# Patient Record
Sex: MALE | Race: WHITE | NOT HISPANIC OR LATINO | Employment: FULL TIME | URBAN - METROPOLITAN AREA
[De-identification: names, ages, dates, MRNs, and addresses within clinical notes are randomized per-mention and may not be internally consistent; named-entity substitution may affect disease eponyms.]

---

## 2020-05-26 ENCOUNTER — HOSPITAL ENCOUNTER (OUTPATIENT)
Dept: OTHER | Facility: HOSPITAL | Age: 20
Discharge: HOME | End: 2020-05-26
Attending: NURSE PRACTITIONER

## 2021-04-11 ENCOUNTER — HOSPITAL ENCOUNTER (EMERGENCY)
Dept: HOSPITAL 49 - FER | Age: 21
LOS: 1 days | Discharge: PSYCHIATRIC OR SUBSTANCE USE FACILITY | End: 2021-04-12
Payer: COMMERCIAL
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
## 2021-04-11 DIAGNOSIS — S16.1XXA: Primary | ICD-10-CM

## 2021-04-11 DIAGNOSIS — Z20.822: ICD-10-CM

## 2021-04-11 DIAGNOSIS — J45.909: ICD-10-CM

## 2021-04-11 DIAGNOSIS — Z88.5: ICD-10-CM

## 2021-04-11 DIAGNOSIS — S00.93XA: ICD-10-CM

## 2021-04-11 LAB
BASOPHIL: 0.6 % (ref 0–2)
BUN SERPL-MCNC: 17 MG/DL (ref 7–18)
BUN/CREAT RATIO (CALC): 16.8 RATIO
CHLORIDE: 102 MMOL/L (ref 98–107)
CO2 (BICARBONATE): 30 MMOL/L (ref 21–32)
CREATININE: 1.01 MG/DL (ref 0.67–1.17)
EOSINOPHIL: 1.3 % (ref 0–5)
GLUCOSE SERPL-MCNC: 103 MG/DL (ref 74–106)
HCT: 48.7 % (ref 42–52)
HGB BLD-MCNC: 16.7 G/DL (ref 13.2–18)
LYMPHOCYTE: 24 % (ref 15–48)
MCH RBC QN AUTO: 30.7 PG (ref 25–31)
MCHC RBC AUTO-ENTMCNC: 34.3 G/DL (ref 32–36)
MCV: 89.5 FL (ref 78–100)
MONOCYTE: 8.3 % (ref 0–12)
MPV: 9 FL (ref 6–9.5)
NEUTROPHIL: 64.6 % (ref 41–80)
NRBC: 0
PLT: 287 K/UL (ref 150–400)
POTASSIUM: 4.1 MMOL/L (ref 3.5–5.1)
RBC MORPHOLOGY: NORMAL
RBC: 5.44 M/UL (ref 4.7–6)
RDW: 13.2 % (ref 11.5–14)
WBC: 11.2 K/UL (ref 4–10.5)

## 2021-04-11 PROCEDURE — U0002 COVID-19 LAB TEST NON-CDC: HCPCS

## 2021-04-12 LAB
AMPHETAMINES: NEGATIVE
BARBITURATES: NEGATIVE
CORONAVIRUS 2019 SARS-COV-2: NEGATIVE
ECSTASY (MDMA): NEGATIVE
INFLUENZA A NAA: NEGATIVE
MARIJUANA (THC): NEGATIVE
METHADONE: NEGATIVE
OPIATES: POSITIVE
OXYCODONE: NEGATIVE

## 2022-05-04 ENCOUNTER — HOSPITAL ENCOUNTER (EMERGENCY)
Dept: HOSPITAL 49 - FER | Age: 22
Discharge: LEFT BEFORE DISCHARGE | End: 2022-05-04
Payer: COMMERCIAL

## 2022-05-04 DIAGNOSIS — R10.13: Primary | ICD-10-CM

## 2022-05-04 DIAGNOSIS — Z53.29: ICD-10-CM

## 2022-06-22 ENCOUNTER — OFFICE VISIT (OUTPATIENT)
Dept: FAMILY MEDICINE CLINIC | Age: 22
End: 2022-06-22

## 2022-06-22 VITALS
WEIGHT: 174.6 LBS | HEART RATE: 89 BPM | HEIGHT: 66 IN | SYSTOLIC BLOOD PRESSURE: 122 MMHG | BODY MASS INDEX: 28.06 KG/M2 | TEMPERATURE: 98.1 F | DIASTOLIC BLOOD PRESSURE: 74 MMHG

## 2022-06-22 DIAGNOSIS — F41.1 GENERALIZED ANXIETY DISORDER: Primary | ICD-10-CM

## 2022-06-22 DIAGNOSIS — Z20.2 EXPOSURE TO SEXUALLY TRANSMITTED DISEASE (STD): ICD-10-CM

## 2022-06-22 DIAGNOSIS — K21.9 GASTROESOPHAGEAL REFLUX DISEASE, UNSPECIFIED WHETHER ESOPHAGITIS PRESENT: ICD-10-CM

## 2022-06-22 PROCEDURE — 99204 OFFICE O/P NEW MOD 45 MIN: CPT | Performed by: NURSE PRACTITIONER

## 2022-06-22 RX ORDER — VENLAFAXINE HYDROCHLORIDE 37.5 MG/1
37.5 CAPSULE, EXTENDED RELEASE ORAL DAILY
Qty: 30 CAPSULE | Refills: 1 | Status: SHIPPED | OUTPATIENT
Start: 2022-06-22 | End: 2022-09-08

## 2022-06-22 RX ORDER — OMEPRAZOLE 20 MG/1
20 CAPSULE, DELAYED RELEASE ORAL DAILY
Qty: 30 CAPSULE | Refills: 1 | Status: SHIPPED | OUTPATIENT
Start: 2022-06-22 | End: 2022-09-08

## 2022-06-22 NOTE — PROGRESS NOTES
Assessment and Plan    Diagnoses and all orders for this visit:    1. Generalized anxiety disorder (Primary)  Comments:  We will try to start on Effexor.  I instructed on importance of compliance with medication instructions.  Follow-up in 6 weeks  Orders:  -     venlafaxine XR (Effexor XR) 37.5 MG 24 hr capsule; Take 1 capsule by mouth Daily.  Dispense: 30 capsule; Refill: 1    2. Exposure to sexually transmitted disease (STD)  -     Hepatitis C RNA, quantitative, PCR (graph); Future  -     HIV-1/O/2 ANTIGEN/ANTIBODY, 4TH GENERATION; Future  -     HSV 1 and 2-Specific Ab, IgG; Future  -     RPR; Future  -     Chlamydia trachomatis, Neisseria gonorrhoeae, PCR - Urine, Urine, Clean Catch; Future    3. Gastroesophageal reflux disease, unspecified whether esophagitis present  Comments:  I will send in a referral for omeprazole however I did discuss that should this persist we may need to send him to gastroenterology for evaluation as to cause.  Orders:  -     omeprazole (priLOSEC) 20 MG capsule; Take 1 capsule by mouth Daily.  Dispense: 30 capsule; Refill: 1        Follow Up   Return in about 6 weeks (around 8/3/2022).    Chief Complaint  Malcolm Weeks presents to [facility] for Establish Care (Patient is here to establish care. Patient states that he has not had a PCP since he was a kid. ), Anxiety (PHQ-9 AND MARCELA 7 completed ), Vomiting (Patient states that this morning he was throwing up stomach acid. States that he has trouble with acid reflux and thinks he may need medication. ), and Exposure to STD (Patient wants to be tested for Herpes)    Subjective          Mental Health concern: Patient complains of concern for anxiety disorder.  He reports the following symptoms: difficulty concentrating, feelings of losing control, insomnia, irritable, racing thoughts. poor concentration or mind goes blank, irritabilityPossible causes contributing are: Symptoms started approximately  "several years ago.  Risk factors: positive family history in  mother  Family history significant for alcoholism and depression.   Previous treatment includes none and medication.  He denies current suicidal and homicidal ideation.   Wants to try effexor as has helped with mom with her symptoms      Vomited first things in the mornings  This has been on ongoing problem since the age of 15  Feels acid reflux  - taking tums daily  Is having problems with diarrhea today but not on a regular basis    Exposed to herpes - 4 weeks ago would like STD testing for \"everything \"no current symptoms as his partner had no symptoms either.             Review of Systems    Objective     Vitals:    06/22/22 1453   BP: 122/74   BP Location: Left arm   Patient Position: Sitting   Cuff Size: Adult   Pulse: 89   Temp: 98.1 °F (36.7 °C)   TempSrc: Oral   Weight: 79.2 kg (174 lb 9.6 oz)   Height: 167.6 cm (66\")     Body mass index is 28.18 kg/m².     Physical Exam  Vitals reviewed.   Constitutional:       Appearance: Normal appearance.   HENT:      Head: Normocephalic.   Eyes:      Pupils: Pupils are equal, round, and reactive to light.   Cardiovascular:      Rate and Rhythm: Normal rate and regular rhythm.      Heart sounds: No murmur heard.  Pulmonary:      Effort: Pulmonary effort is normal.      Breath sounds: Normal breath sounds.   Musculoskeletal:         General: Normal range of motion.   Neurological:      Mental Status: He is alert.   Psychiatric:         Mood and Affect: Mood normal.         Behavior: Behavior normal.         Result Review :                    Allergies   Allergen Reactions   • Codeine Anaphylaxis      Past Medical History:   Diagnosis Date   • Acid reflux    • Hypertension      Current Outpatient Medications   Medication Sig Dispense Refill   • omeprazole (priLOSEC) 20 MG capsule Take 1 capsule by mouth Daily. 30 capsule 1   • venlafaxine XR (Effexor XR) 37.5 MG 24 hr capsule Take 1 capsule by mouth Daily. 30 " capsule 1     No current facility-administered medications for this visit.     Past Surgical History:   Procedure Laterality Date   • ADENOIDECTOMY     • APPENDECTOMY     • EYE SURGERY     • TONSILLECTOMY        Social History     Tobacco Use   • Smoking status: Current Every Day Smoker     Packs/day: 1.50     Years: 10.00     Pack years: 15.00     Types: Cigarettes   • Smokeless tobacco: Never Used   Vaping Use   • Vaping Use: Never used   Substance Use Topics   • Alcohol use: Not Currently   • Drug use: Defer     Family History   Problem Relation Age of Onset   • No Known Problems Mother    • Heart disease Father    • Cancer Father         blood cancer     Health Maintenance Due   Topic Date Due   • Pneumococcal Vaccine 0-64 (1 - PCV) Never done   • TDAP/TD VACCINES (2 - Td or Tdap) 10/10/2018   • HEPATITIS C SCREENING  Never done      Immunization History   Administered Date(s) Administered   • COVID-19 (PFIZER) PURPLE CAP 11/18/2021   • FluMist 2-49yrs 09/23/2014   • Hepatitis A 08/21/2018   • Influenza LAIV (Nasal) 10/07/2009   • Influenza Quad Vaccine (Inpatient) 10/24/2011, 08/19/2013   • Influenza, Unspecified 11/18/2010, 02/27/2013   • Meningococcal Conjugate 10/10/2008, 09/04/2014   • Tdap 10/10/2008   • Varicella 10/24/2011

## 2022-07-12 ENCOUNTER — TELEPHONE (OUTPATIENT)
Dept: FAMILY MEDICINE CLINIC | Age: 22
End: 2022-07-12

## 2022-08-05 ENCOUNTER — TELEPHONE (OUTPATIENT)
Dept: FAMILY MEDICINE CLINIC | Age: 22
End: 2022-08-05

## 2022-08-05 NOTE — TELEPHONE ENCOUNTER
Attempted to contact patient and it went straight to the [place] [facility]. Unable to leave voicemail with patient. Letter has been mailed regarding first no show with Natalya on 8/3/22

## 2022-09-06 DIAGNOSIS — K21.9 GASTROESOPHAGEAL REFLUX DISEASE, UNSPECIFIED WHETHER ESOPHAGITIS PRESENT: ICD-10-CM

## 2022-09-06 DIAGNOSIS — F41.1 GENERALIZED ANXIETY DISORDER: ICD-10-CM

## 2022-09-08 RX ORDER — VENLAFAXINE HYDROCHLORIDE 37.5 MG/1
CAPSULE, EXTENDED RELEASE ORAL
Qty: 30 CAPSULE | Refills: 0 | Status: SHIPPED | OUTPATIENT
Start: 2022-09-08 | End: 2022-09-21 | Stop reason: DRUGHIGH

## 2022-09-08 RX ORDER — OMEPRAZOLE 20 MG/1
CAPSULE, DELAYED RELEASE ORAL
Qty: 30 CAPSULE | Refills: 0 | Status: SHIPPED | OUTPATIENT
Start: 2022-09-08 | End: 2022-10-10

## 2022-09-21 ENCOUNTER — OFFICE VISIT (OUTPATIENT)
Dept: FAMILY MEDICINE CLINIC | Age: 22
End: 2022-09-21

## 2022-09-21 VITALS
HEART RATE: 88 BPM | HEIGHT: 66 IN | TEMPERATURE: 97.6 F | BODY MASS INDEX: 27 KG/M2 | DIASTOLIC BLOOD PRESSURE: 84 MMHG | SYSTOLIC BLOOD PRESSURE: 122 MMHG | WEIGHT: 168 LBS

## 2022-09-21 DIAGNOSIS — F41.1 GENERALIZED ANXIETY DISORDER: Primary | ICD-10-CM

## 2022-09-21 PROCEDURE — 99214 OFFICE O/P EST MOD 30 MIN: CPT | Performed by: NURSE PRACTITIONER

## 2022-09-21 RX ORDER — VENLAFAXINE HYDROCHLORIDE 75 MG/1
75 CAPSULE, EXTENDED RELEASE ORAL DAILY
Qty: 30 CAPSULE | Refills: 3 | Status: SHIPPED | OUTPATIENT
Start: 2022-09-21 | End: 2023-01-13

## 2022-09-21 RX ORDER — VENLAFAXINE 75 MG/1
75 TABLET ORAL 2 TIMES DAILY
Qty: 30 TABLET | Refills: 3 | Status: SHIPPED | OUTPATIENT
Start: 2022-09-21 | End: 2022-09-21 | Stop reason: DRUGHIGH

## 2022-09-21 NOTE — PROGRESS NOTES
"Assessment and Plan   Diagnoses and all orders for this visit:    1. Generalized anxiety disorder (Primary)  Comments:  increase the dose of effexor to 75mg per day.  Follow up in 3 months, sooner if problems.    Orders:  -     venlafaxine (EFFEXOR) 75 MG tablet; Take 1 tablet by mouth 2 (Two) Times a Day.  Dispense: 30 tablet; Refill: 3                  Follow Up   Return in about 3 months (around 12/21/2022) for Recheck.    Chief Complaint  Malcolm Weeks presents to [facility] for Follow-up (Medication management (pt wanted to talk about upping dosage)) and Anxiety    Subjective          History of Present Illness  Malcolm is here to follow up on anxiety.  He was started on Venlafaxine in June Has been on for about 3 months  Reports that it did help in the beginning from the medication but has since not been working as well.  Irritability coming back still with anxiety.  He denies any side effects from the medication.          Review of Systems    Objective     Vitals:    09/21/22 1556   BP: 122/84   BP Location: Left arm   Patient Position: Sitting   Cuff Size: Adult   Pulse: 88   Temp: 97.6 °F (36.4 °C)   TempSrc: Oral   Weight: 76.2 kg (168 lb)   Height: 167.6 cm (66\")     Body mass index is 27.12 kg/m².     Physical Exam  Vitals reviewed.   Constitutional:       Appearance: Normal appearance.   HENT:      Head: Normocephalic.   Eyes:      Pupils: Pupils are equal, round, and reactive to light.   Cardiovascular:      Rate and Rhythm: Normal rate and regular rhythm.      Heart sounds: No murmur heard.  Pulmonary:      Effort: Pulmonary effort is normal.      Breath sounds: Normal breath sounds.   Musculoskeletal:         General: Normal range of motion.   Neurological:      Mental Status: He is alert.   Psychiatric:         Mood and Affect: Mood is anxious.         Behavior: Behavior normal.         Result Review                        Allergies   Allergen Reactions   • " Codeine Anaphylaxis      Past Medical History:   Diagnosis Date   • Acid reflux    • Hypertension      Current Outpatient Medications   Medication Sig Dispense Refill   • omeprazole (priLOSEC) 20 MG capsule TAKE 1 CAPSULE BY MOUTH ONCE DAILY 30 capsule 0   • venlafaxine (EFFEXOR) 75 MG tablet Take 1 tablet by mouth 2 (Two) Times a Day. 30 tablet 3     No current facility-administered medications for this visit.     Past Surgical History:   Procedure Laterality Date   • ADENOIDECTOMY     • APPENDECTOMY     • EYE SURGERY     • TONSILLECTOMY        Health Maintenance Due   Topic Date Due   • TDAP/TD VACCINES (2 - Td or Tdap) 10/10/2018   • HEPATITIS C SCREENING  Never done      Immunization History   Administered Date(s) Administered   • COVID-19 (PFIZER) PURPLE CAP 11/18/2021   • FluMist 2-49yrs 09/23/2014   • Hepatitis A 08/21/2018   • Influenza LAIV (Nasal) 10/07/2009   • Influenza Quad Vaccine (Inpatient) 10/24/2011, 08/19/2013   • Influenza, Unspecified 11/18/2010, 02/27/2013   • Meningococcal Conjugate 10/10/2008, 09/04/2014   • Tdap 10/10/2008   • Varicella 10/24/2011

## 2022-10-08 DIAGNOSIS — K21.9 GASTROESOPHAGEAL REFLUX DISEASE, UNSPECIFIED WHETHER ESOPHAGITIS PRESENT: ICD-10-CM

## 2022-10-10 RX ORDER — OMEPRAZOLE 20 MG/1
CAPSULE, DELAYED RELEASE ORAL
Qty: 30 CAPSULE | Refills: 11 | Status: SHIPPED | OUTPATIENT
Start: 2022-10-10

## 2023-01-13 DIAGNOSIS — F41.1 GENERALIZED ANXIETY DISORDER: ICD-10-CM

## 2023-01-13 RX ORDER — VENLAFAXINE HYDROCHLORIDE 75 MG/1
CAPSULE, EXTENDED RELEASE ORAL
Qty: 30 CAPSULE | Refills: 0 | Status: SHIPPED | OUTPATIENT
Start: 2023-01-13 | End: 2023-02-16 | Stop reason: DRUGHIGH

## 2023-02-16 ENCOUNTER — OFFICE VISIT (OUTPATIENT)
Dept: FAMILY MEDICINE CLINIC | Age: 23
End: 2023-02-16
Payer: COMMERCIAL

## 2023-02-16 VITALS
TEMPERATURE: 97.6 F | HEART RATE: 74 BPM | DIASTOLIC BLOOD PRESSURE: 91 MMHG | BODY MASS INDEX: 30.7 KG/M2 | SYSTOLIC BLOOD PRESSURE: 137 MMHG | HEIGHT: 66 IN | OXYGEN SATURATION: 99 % | WEIGHT: 191 LBS

## 2023-02-16 DIAGNOSIS — K21.9 GASTROESOPHAGEAL REFLUX DISEASE, UNSPECIFIED WHETHER ESOPHAGITIS PRESENT: ICD-10-CM

## 2023-02-16 DIAGNOSIS — F41.1 GENERALIZED ANXIETY DISORDER: Primary | ICD-10-CM

## 2023-02-16 DIAGNOSIS — R93.2 ABNORMAL GALLBLADDER ULTRASOUND: ICD-10-CM

## 2023-02-16 DIAGNOSIS — R11.15 CYCLICAL VOMITING SYNDROME NOT ASSOCIATED WITH MIGRAINE: ICD-10-CM

## 2023-02-16 DIAGNOSIS — K80.20 CALCULUS OF GALLBLADDER WITHOUT CHOLECYSTITIS WITHOUT OBSTRUCTION: ICD-10-CM

## 2023-02-16 PROCEDURE — 99214 OFFICE O/P EST MOD 30 MIN: CPT | Performed by: NURSE PRACTITIONER

## 2023-02-16 RX ORDER — VENLAFAXINE HYDROCHLORIDE 150 MG/1
150 TABLET, EXTENDED RELEASE ORAL DAILY
Qty: 30 EACH | Refills: 5 | Status: SHIPPED | OUTPATIENT
Start: 2023-02-16

## 2023-02-16 NOTE — PROGRESS NOTES
Chief Complaint  Malcolm Weeks presents to [facility] for Follow-up (CC: GERD, & Anxiety /Patient feels that he may need to up dosage of Effexor )      Subjective     History of Present Illness  Malcolm presents today for follow up on anxiety.   He reports that the current treatment is not working well at the current dose.  Current treatment includes :  Effexor   Current, ongoing symptoms include: irritable.    He denies current suicidal and homicidal ideation.   Malcolm has previously tried lower dose of effexor without benefit or have experienced side effects.     He complains of the following side effects from the treatment: none and ineffective at the current dose.   Psychotherapy : Not currently , but previously under the care of [facility].   Malcolm is also inquiring about a letter for the medical use of marijuana.  He does report that he does have a history of PTSD, although we do not have any documentation here regarding this.  [facility] for his [facility].      Malcolm has also continue to have vomiting.  He states that despite the use of omeprazole, he continues to vomit.  He did have a CT abdomen in 2020 by Veronica Pierce - showing inflammation in the GB - however, he reports that he was incarcerated at the time and was unable to get that taken care of.  His abdominal pain did improve, so he never followed up on that. He would not be opposed to having an EGD either as his CT scan showed a small hiatal hernia.  He reports that on occasion, he will have what he describes as 'dip spit' in his vomit and upon questioning, some occasional coffee ground appearance.             Assessment and Plan       Diagnoses and all orders for this visit:    1. Generalized anxiety disorder (Primary)  Comments:  increase his effexor, consider referral to specialty for further management  - consider counseling   Orders:  -      "venlafaxine (EFFEXOR) 150 MG tablet sustained-release 24 hour 24 hr tablet; Take 1 tablet by mouth Daily.  Dispense: 30 each; Refill: 5    2. Gastroesophageal reflux disease, unspecified whether esophagitis present  Comments:  will send referral for recommend EGD to evaluate   Orders:  -     US Gallbladder; Future  -     Ambulatory Referral to General Surgery    3. Cyclical vomiting syndrome not associated with migraine  -     Ambulatory Referral to General Surgery        Follow Up   Return for Pending imaging results.      New Medications Ordered This Visit   Medications   • venlafaxine (EFFEXOR) 150 MG tablet sustained-release 24 hour 24 hr tablet     Sig: Take 1 tablet by mouth Daily.     Dispense:  30 each     Refill:  5       Medications Discontinued During This Encounter   Medication Reason   • venlafaxine XR (EFFEXOR-XR) 75 MG 24 hr capsule Dose adjustment            Review of Systems    Objective     Vitals:    02/16/23 1549   BP: 137/91   BP Location: Right arm   Patient Position: Sitting   Cuff Size: Adult   Pulse: 74   Temp: 97.6 °F (36.4 °C)   TempSrc: Oral   SpO2: 99%   Weight: 86.6 kg (191 lb)   Height: 167.6 cm (66\")     Body mass index is 30.83 kg/m².     Physical Exam  Vitals reviewed.   Constitutional:       Appearance: Normal appearance.   HENT:      Head: Normocephalic.   Eyes:      Pupils: Pupils are equal, round, and reactive to light.   Cardiovascular:      Rate and Rhythm: Normal rate and regular rhythm.      Heart sounds: No murmur heard.  Pulmonary:      Effort: Pulmonary effort is normal.      Breath sounds: Normal breath sounds.   Abdominal:      Tenderness: There is abdominal tenderness in the epigastric area.   Musculoskeletal:         General: Normal range of motion.   Neurological:      Mental Status: He is alert.   Psychiatric:         Mood and Affect: Mood normal.         Behavior: Behavior normal.            Result Review                       Allergies   Allergen Reactions   • " Codeine Anaphylaxis      Past Medical History:   Diagnosis Date   • Acid reflux    • Hypertension      Current Outpatient Medications   Medication Sig Dispense Refill   • omeprazole (priLOSEC) 20 MG capsule TAKE 1 CAPSULE BY MOUTH ONCE DAILY 30 capsule 11   • venlafaxine (EFFEXOR) 150 MG tablet sustained-release 24 hour 24 hr tablet Take 1 tablet by mouth Daily. 30 each 5     No current facility-administered medications for this visit.     Past Surgical History:   Procedure Laterality Date   • ADENOIDECTOMY     • APPENDECTOMY     • EYE SURGERY     • TONSILLECTOMY        Health Maintenance Due   Topic Date Due   • TDAP/TD VACCINES (2 - Td or Tdap) 10/10/2018   • COVID-19 Vaccine (2 - Pfizer series) 12/09/2021   • HEPATITIS C SCREENING  Never done   • INFLUENZA VACCINE  08/01/2022      Immunization History   Administered Date(s) Administered   • COVID-19 (PFIZER) PURPLE CAP 11/18/2021   • FluMist 2-49yrs 09/23/2014   • Hepatitis A 08/21/2018   • Influenza LAIV (Nasal) 10/07/2009   • Influenza Quad Vaccine (Inpatient) 10/24/2011, 08/19/2013   • Influenza, Unspecified 11/18/2010, 02/27/2013   • Meningococcal Conjugate 10/10/2008, 09/04/2014   • Tdap 10/10/2008   • Varicella 10/24/2011

## 2023-03-07 ENCOUNTER — HOSPITAL ENCOUNTER (OUTPATIENT)
Dept: ULTRASOUND IMAGING | Facility: HOSPITAL | Age: 23
Discharge: HOME | End: 2023-03-07
Admitting: NURSE PRACTITIONER
Payer: COMMERCIAL

## 2023-03-07 DIAGNOSIS — K21.9 GASTROESOPHAGEAL REFLUX DISEASE, UNSPECIFIED WHETHER ESOPHAGITIS PRESENT: ICD-10-CM

## 2023-03-07 PROCEDURE — 76705 ECHO EXAM OF ABDOMEN: CPT

## 2023-03-16 ENCOUNTER — TELEPHONE (OUTPATIENT)
Dept: SURGERY | Facility: CLINIC | Age: 23
End: 2023-03-16

## 2023-03-16 NOTE — TELEPHONE ENCOUNTER
SAME DAY CANCEL APPT  Caller: LIONEL DIAZ    Relationship to patient: GRANDMOTHER    Best call back number: [phone]    Patient is needing: SAME DAY CANCEL APPT; [facility] R/S FOR 3.22.23

## 2023-03-22 ENCOUNTER — OFFICE VISIT (OUTPATIENT)
Dept: SURGERY | Facility: CLINIC | Age: 23
End: 2023-03-22
Payer: COMMERCIAL

## 2023-03-22 ENCOUNTER — PREP FOR SURGERY (OUTPATIENT)
Dept: OTHER | Facility: HOSPITAL | Age: 23
End: 2023-03-22
Payer: COMMERCIAL

## 2023-03-22 VITALS — RESPIRATION RATE: 14 BRPM | HEIGHT: 66 IN | WEIGHT: 183 LBS | BODY MASS INDEX: 29.41 KG/M2

## 2023-03-22 DIAGNOSIS — K21.00 GASTROESOPHAGEAL REFLUX DISEASE WITH ESOPHAGITIS, UNSPECIFIED WHETHER HEMORRHAGE: Primary | ICD-10-CM

## 2023-03-22 DIAGNOSIS — R13.10 DYSPHAGIA, UNSPECIFIED TYPE: Primary | ICD-10-CM

## 2023-03-22 PROCEDURE — 1159F MED LIST DOCD IN RCRD: CPT | Performed by: SURGERY

## 2023-03-22 PROCEDURE — 1160F RVW MEDS BY RX/DR IN RCRD: CPT | Performed by: SURGERY

## 2023-03-22 PROCEDURE — 99203 OFFICE O/P NEW LOW 30 MIN: CPT | Performed by: SURGERY

## 2023-03-27 NOTE — PROGRESS NOTES
General Surgery/Colorectal Surgery Note    Patient Name:  Malcolm Weeks  YOB: 2000  [id number]    Referring Provider: Natalya Obando APRN      Patient Care Team:  Natalya Obando APRN as PCP - General (Nurse Practitioner)    Chief complaint difficulty swallowing    Subjective .     History of present illness:    He has a known history of a hiatal hernia found on CT imaging.  Recently worsening epigastric pain with lower substernal dysphagia and dry heaving.  No blood in his emesis.  Reflux treated with Tums and PPI.  Distant history of ulcer disease.  No NSAID abuse.  No blood thinner use.  Consistent acid taste in his mouth.  Will wake up with significant reflux at night.  Positive tobacco abuse.  Previous laparoscopic appendectomy.    CT abdomen and pelvis 5/26/2021 small hiatal hernia  Right upper quadrant ultrasound 3/7/2023 with cholelithiasis    History:  Past Medical History:   Diagnosis Date   • Acid reflux    • Hypertension        Past Surgical History:   Procedure Laterality Date   • ADENOIDECTOMY     • APPENDECTOMY     • EYE SURGERY     • TONSILLECTOMY         Family History   Problem Relation Age of Onset   • No Known Problems Mother    • Heart disease Father    • Cancer Father         blood cancer       Social History     Tobacco Use   • Smoking status: Every Day     Packs/day: 0.50     Years: 10.00     Pack years: 5.00     Types: Cigarettes   • Smokeless tobacco: Never   Vaping Use   • Vaping Use: Never used   Substance Use Topics   • Alcohol use: Not Currently   • Drug use: Defer       Review of Systems  All systems were reviewed and negative except for:   Review of Systems   Constitutional: Negative for chills, fever and unexpected weight loss.   HENT: Negative for congestion, nosebleeds and voice change.    Eyes: Negative for blurred vision, double vision and discharge.   Respiratory: Negative for apnea, chest tightness and shortness of breath.    Cardiovascular: Negative  for chest pain and leg swelling.   Gastrointestinal:        See HPI   Endocrine: Negative for cold intolerance and heat intolerance.   Genitourinary: Negative for dysuria, hematuria and urgency.   Musculoskeletal: Negative for back pain, joint swelling and neck pain.   Skin: Negative for color change and dry skin.   Neurological: Negative for dizziness and confusion.   Hematological: Negative for adenopathy.   Psychiatric/Behavioral: Negative for agitation and behavioral problems.     MEDS:  Prior to Admission medications    Medication Sig Start Date End Date Taking? Authorizing Provider   omeprazole (priLOSEC) 20 MG capsule TAKE 1 CAPSULE BY MOUTH ONCE DAILY 10/10/22  Yes Natalya Obando APRN   venlafaxine (EFFEXOR) 150 MG tablet sustained-release 24 hour 24 hr tablet Take 1 tablet by mouth Daily. 2/16/23  Yes Natalya Obando APRN        Allergies:  Codeine    Objective     Vital Signs        Physical Exam:     General Appearance:    Alert, cooperative, in no acute distress   Head:    Normocephalic, without obvious abnormality, atraumatic   Eyes:          Conjunctivae and sclerae normal, no icterus,     Ears:    Ears appear intact with no abnormalities noted   Throat:   No oral lesions, no thrush, oral mucosa moist   Neck:   No adenopathy, supple, trachea midline, no thyromegaly   Back:     No kyphosis present, no scoliosis present, no skin lesions,      erythema or scars, no tenderness to percussion or                   palpation,   range of motion normal   Lungs:     Clear to auscultation,respirations regular, even and                  unlabored    Heart:    Regular rhythm and normal rate, normal S1 and S2, no            murmur, no gallop, no rub, no click   Chest Wall:    No abnormalities observed   Abdomen:     Normal bowel sounds, no masses, no organomegaly, soft        non-tender, non-distended, no guarding, no rebound                tenderness   Rectal:        Extremities:   Moves all extremities well, no  "edema, no cyanosis, no             redness   Pulses:   Pulses palpable and equal bilaterally   Skin:   No bleeding, bruising or rash   Lymph nodes:   No palpable adenopathy   Neurologic:   A/o x 4 with no deficits       Results Review:   {Results Review:[id number]::\"I reviewed the patient's new clinical results.\"    LABS/IMAGING:  No results found for this or any previous visit.     Result Review :     Assessment & Plan     Epigastric abdominal pain, dry heaving, dysphagia  Tobacco abuse  GERD with hiatal hernia    Discussion with patient.  I reviewed his work-up with the results mentioned above.  With his symptoms I recommended upper endoscopy for further evaluation and biopsies.  Benefits and alternatives discussed.  Risk procedure including bleeding perforation discussed.  All questions answered.  He agrees with the plan.  Orders placed.  I explained to him he would need to quit smoking prior to elective hiatal hernia repair.  I offered to help him quit smoking.  He declined.  Thank you for the consult                This document has been electronically signed by Tunde Bustos MD  March 27, 2023 12:04 EDT  "

## 2023-05-04 ENCOUNTER — TELEPHONE (OUTPATIENT)
Dept: FAMILY MEDICINE CLINIC | Age: 23
End: 2023-05-04
Payer: COMMERCIAL

## 2023-05-04 DIAGNOSIS — Z30.09 SCREENING AND EVALUATION FOR VASECTOMY: Primary | ICD-10-CM

## 2023-05-04 NOTE — TELEPHONE ENCOUNTER
Pt's grandmother came and said that the pt is requesting a referral for a vasectomy he wants someone with in [place] she also asked that we call her with the appt please

## 2023-08-08 DIAGNOSIS — F41.1 GENERALIZED ANXIETY DISORDER: ICD-10-CM

## 2023-08-09 RX ORDER — VENLAFAXINE HYDROCHLORIDE 150 MG/1
CAPSULE, EXTENDED RELEASE ORAL
Qty: 30 CAPSULE | Refills: 0 | Status: SHIPPED | OUTPATIENT
Start: 2023-08-09

## 2023-08-16 ENCOUNTER — OFFICE VISIT (OUTPATIENT)
Dept: FAMILY MEDICINE CLINIC | Age: 23
End: 2023-08-16
Payer: COMMERCIAL

## 2023-08-16 ENCOUNTER — LAB (OUTPATIENT)
Dept: LAB | Facility: HOSPITAL | Age: 23
End: 2023-08-16
Payer: COMMERCIAL

## 2023-08-16 VITALS
WEIGHT: 185.2 LBS | DIASTOLIC BLOOD PRESSURE: 80 MMHG | TEMPERATURE: 98.4 F | OXYGEN SATURATION: 95 % | BODY MASS INDEX: 29.77 KG/M2 | SYSTOLIC BLOOD PRESSURE: 129 MMHG | HEIGHT: 66 IN | HEART RATE: 116 BPM

## 2023-08-16 DIAGNOSIS — Z00.00 ROUTINE GENERAL MEDICAL EXAMINATION AT A HEALTH CARE FACILITY: Primary | ICD-10-CM

## 2023-08-16 DIAGNOSIS — K21.9 GASTROESOPHAGEAL REFLUX DISEASE, UNSPECIFIED WHETHER ESOPHAGITIS PRESENT: ICD-10-CM

## 2023-08-16 DIAGNOSIS — Z11.59 SCREENING FOR VIRAL DISEASE: ICD-10-CM

## 2023-08-16 DIAGNOSIS — Z11.3 SCREENING FOR STDS (SEXUALLY TRANSMITTED DISEASES): ICD-10-CM

## 2023-08-16 DIAGNOSIS — Z13.6 SCREENING FOR CARDIOVASCULAR CONDITION: ICD-10-CM

## 2023-08-16 DIAGNOSIS — F41.1 GENERALIZED ANXIETY DISORDER: ICD-10-CM

## 2023-08-16 LAB
ALBUMIN SERPL-MCNC: 4.4 G/DL (ref 3.5–5.2)
ALBUMIN/GLOB SERPL: 1.7 G/DL
ALP SERPL-CCNC: 107 U/L (ref 39–117)
ALT SERPL W P-5'-P-CCNC: 73 U/L (ref 1–41)
ANION GAP SERPL CALCULATED.3IONS-SCNC: 11.2 MMOL/L (ref 5–15)
AST SERPL-CCNC: 41 U/L (ref 1–40)
BILIRUB SERPL-MCNC: 0.5 MG/DL (ref 0–1.2)
BUN SERPL-MCNC: 13 MG/DL (ref 6–20)
BUN/CREAT SERPL: 10.2 (ref 7–25)
C TRACH RRNA CVX QL NAA+PROBE: NOT DETECTED
CALCIUM SPEC-SCNC: 9.2 MG/DL (ref 8.6–10.5)
CHLORIDE SERPL-SCNC: 102 MMOL/L (ref 98–107)
CHOLEST SERPL-MCNC: 167 MG/DL (ref 0–200)
CO2 SERPL-SCNC: 26.8 MMOL/L (ref 22–29)
CREAT SERPL-MCNC: 1.27 MG/DL (ref 0.76–1.27)
EGFRCR SERPLBLD CKD-EPI 2021: 80.4 ML/MIN/1.73
GLOBULIN UR ELPH-MCNC: 2.6 GM/DL
GLUCOSE SERPL-MCNC: 121 MG/DL (ref 65–99)
HCV AB SER DONR QL: NORMAL
HDLC SERPL-MCNC: 23 MG/DL (ref 40–60)
LDLC SERPL CALC-MCNC: 121 MG/DL (ref 0–100)
LDLC/HDLC SERPL: 5.17 {RATIO}
N GONORRHOEA RRNA SPEC QL NAA+PROBE: NOT DETECTED
POTASSIUM SERPL-SCNC: 3.7 MMOL/L (ref 3.5–5.2)
PROT SERPL-MCNC: 7 G/DL (ref 6–8.5)
SODIUM SERPL-SCNC: 140 MMOL/L (ref 136–145)
TRIGL SERPL-MCNC: 126 MG/DL (ref 0–150)
VLDLC SERPL-MCNC: 23 MG/DL (ref 5–40)

## 2023-08-16 PROCEDURE — 87491 CHLMYD TRACH DNA AMP PROBE: CPT

## 2023-08-16 PROCEDURE — 80053 COMPREHEN METABOLIC PANEL: CPT

## 2023-08-16 PROCEDURE — 86592 SYPHILIS TEST NON-TREP QUAL: CPT

## 2023-08-16 PROCEDURE — 86803 HEPATITIS C AB TEST: CPT

## 2023-08-16 PROCEDURE — 86696 HERPES SIMPLEX TYPE 2 TEST: CPT

## 2023-08-16 PROCEDURE — 87591 N.GONORRHOEAE DNA AMP PROB: CPT

## 2023-08-16 PROCEDURE — G0432 EIA HIV-1/HIV-2 SCREEN: HCPCS

## 2023-08-16 PROCEDURE — 86695 HERPES SIMPLEX TYPE 1 TEST: CPT

## 2023-08-16 PROCEDURE — 36415 COLL VENOUS BLD VENIPUNCTURE: CPT

## 2023-08-16 PROCEDURE — 80061 LIPID PANEL: CPT

## 2023-08-16 RX ORDER — VENLAFAXINE HYDROCHLORIDE 150 MG/1
150 CAPSULE, EXTENDED RELEASE ORAL DAILY
Qty: 90 CAPSULE | Refills: 1 | Status: SHIPPED | OUTPATIENT
Start: 2023-08-16

## 2023-08-16 RX ORDER — OMEPRAZOLE 20 MG/1
20 CAPSULE, DELAYED RELEASE ORAL DAILY
Qty: 90 CAPSULE | Refills: 1 | Status: SHIPPED | OUTPATIENT
Start: 2023-08-16

## 2023-08-16 NOTE — PROGRESS NOTES
Chief Complaint  Malcolm Weeks presents to [facility] for Anxiety (6 mo. F/U ) and Heartburn      Subjective     History of Present Illness  Malcolm presents today for follow up on anxiety.    He reports they are doing well on current treatment of   Effexor  Malcolm has been on this medication at the current dose for several  years and feels it is working well for him.    Malcolm is here today for annual exam.   Last annual exam was 1 year  Last eye exam:  years  PROVIDER:  n/a  Last dental exam:    years    PROVIDER:  n/a  Diet / exercise:   No special diet or specific exercise program  Patient's Body mass index is 29.89 kg/mý. indicating that he is overweight (BMI 25-29.9)  Satisfied with weight?  yes    Patient Care Team:  Natalya Obando APRN as PCP - General (Nurse Practitioner)     Regarding acid reflux, taking omeprazole daily- working well     Recently broke up relationship and is wanting to get STD checked.  His previous partner was diagnosed with herpes and wants to make sure that he is clear.  He denies any symptoms currently / previously   or any prior STI diagnosis         Assessment and Plan       Diagnoses and all orders for this visit:    1. Annual Wellness (Primary)  Comments:  annual wellness recommended, health maintenance recommendations discussed    2. Gastroesophageal reflux disease, unspecified whether esophagitis present  Comments:  refills provided  follow up 6 months  Orders:  -     omeprazole (priLOSEC) 20 MG capsule; Take 1 capsule by mouth Daily.  Dispense: 90 capsule; Refill: 1    3. Generalized anxiety disorder  Comments:  continue current dose, follow up in 6 months  sooner if problems / worsening symptoms  Orders:  -     venlafaxine XR (EFFEXOR-XR) 150 MG 24 hr capsule; Take 1 capsule by mouth Daily.  Dispense: 90 capsule; Refill: 1    4. Screening for STDs (sexually transmitted diseases)  -     Hepatitis C antibody; Future  -     RPR; Future  -      "HSV 1 and 2-Specific Ab, IgG; Future  -     HIV-1/O/2 ANTIGEN/ANTIBODY, 4TH GENERATION; Future  -     Chlamydia trachomatis, Neisseria gonorrhoeae, PCR - Urine, Urine, Random Void; Future    5. Screening for viral disease  -     Hepatitis C antibody; Future    6. Screening for cardiovascular condition  -     Comprehensive metabolic panel; Future  -     Lipid panel; Future        Follow Up   Return in about 6 months (around 2/16/2024) for Recheck.      New Medications Ordered This Visit   Medications    omeprazole (priLOSEC) 20 MG capsule     Sig: Take 1 capsule by mouth Daily.     Dispense:  90 capsule     Refill:  1    venlafaxine XR (EFFEXOR-XR) 150 MG 24 hr capsule     Sig: Take 1 capsule by mouth Daily.     Dispense:  90 capsule     Refill:  1       Medications Discontinued During This Encounter   Medication Reason    omeprazole (priLOSEC) 20 MG capsule Reorder    venlafaxine XR (EFFEXOR-XR) 150 MG 24 hr capsule Reorder            Review of Systems    Objective     Vitals:    08/16/23 1549   BP: 129/80   BP Location: Left arm   Patient Position: Sitting   Cuff Size: Adult   Pulse: 116   Temp: 98.4 øF (36.9 øC)   TempSrc: Oral   SpO2: 95%   Weight: 84 kg (185 lb 3.2 oz)   Height: 167.6 cm (66\")     Body mass index is 29.89 kg/mý.     Physical Exam  Vitals reviewed.   Constitutional:       Appearance: Normal appearance.   HENT:      Head: Normocephalic.      Mouth/Throat:      Mouth: Mucous membranes are moist.      Pharynx: Oropharynx is clear.   Eyes:      Conjunctiva/sclera: Conjunctivae normal.   Cardiovascular:      Rate and Rhythm: Normal rate and regular rhythm.      Heart sounds: No murmur heard.  Pulmonary:      Effort: Pulmonary effort is normal.      Breath sounds: Normal breath sounds.   Abdominal:      General: Bowel sounds are normal.      Tenderness: There is no abdominal tenderness.   Musculoskeletal:         General: Normal range of motion.      Cervical back: Normal range of motion.   Skin:     " General: Skin is warm and dry.   Neurological:      General: No focal deficit present.      Mental Status: He is alert. Mental status is at baseline.   Psychiatric:         Mood and Affect: Mood normal. Mood is depressed. Affect is flat.         Behavior: Behavior normal.         Thought Content: Thought content normal.          Result Review                       Allergies   Allergen Reactions    Codeine Anaphylaxis      Past Medical History:   Diagnosis Date    Acid reflux     Hypertension      Current Outpatient Medications   Medication Sig Dispense Refill    omeprazole (priLOSEC) 20 MG capsule Take 1 capsule by mouth Daily. 90 capsule 1    venlafaxine XR (EFFEXOR-XR) 150 MG 24 hr capsule Take 1 capsule by mouth Daily. 90 capsule 1     No current facility-administered medications for this visit.     Past Surgical History:   Procedure Laterality Date    ADENOIDECTOMY      APPENDECTOMY      EYE SURGERY      TONSILLECTOMY        Health Maintenance Due   Topic Date Due    TDAP/TD VACCINES (2 - Td or Tdap) 10/10/2018    HEPATITIS C SCREENING  Never done      Immunization History   Administered Date(s) Administered    COVID-19 (PFIZER) Purple Cap Monovalent 11/18/2021    FluMist 2-49yrs 09/23/2014    Hepatitis A 08/21/2018    Influenza LAIV (Nasal) 10/07/2009    Influenza Quad Vaccine (Inpatient) 10/24/2011, 08/19/2013    Influenza, Unspecified 11/18/2010, 02/27/2013    Meningococcal Conjugate 10/10/2008, 09/04/2014    Tdap 10/10/2008    Varicella 10/24/2011         Part of this note may be an electronic transcription/translation of spoken language to printed   text using the Dragon Dictation System.      WEI Siddiqi

## 2023-08-17 LAB — HIV 1+2 AB+HIV1 P24 AG SERPL QL IA: NORMAL

## 2023-08-18 LAB
HSV1 IGG SER IA-ACNC: <0.91 INDEX (ref 0–0.9)
HSV2 IGG SER IA-ACNC: <0.91 INDEX (ref 0–0.9)
RPR SER QL: NON REACTIVE

## 2024-02-13 ENCOUNTER — OFFICE VISIT (OUTPATIENT)
Dept: FAMILY MEDICINE CLINIC | Age: 24
End: 2024-02-13
Payer: COMMERCIAL

## 2024-02-13 VITALS
BODY MASS INDEX: 29.15 KG/M2 | OXYGEN SATURATION: 100 % | TEMPERATURE: 98.9 F | HEIGHT: 66 IN | SYSTOLIC BLOOD PRESSURE: 128 MMHG | DIASTOLIC BLOOD PRESSURE: 80 MMHG | HEART RATE: 95 BPM | WEIGHT: 181.4 LBS

## 2024-02-13 DIAGNOSIS — F41.1 GENERALIZED ANXIETY DISORDER: Primary | ICD-10-CM

## 2024-02-13 DIAGNOSIS — K21.9 GASTROESOPHAGEAL REFLUX DISEASE, UNSPECIFIED WHETHER ESOPHAGITIS PRESENT: ICD-10-CM

## 2024-02-13 DIAGNOSIS — R93.2 ABNORMAL GALLBLADDER ULTRASOUND: ICD-10-CM

## 2024-02-13 PROCEDURE — 1159F MED LIST DOCD IN RCRD: CPT | Performed by: NURSE PRACTITIONER

## 2024-02-13 PROCEDURE — 1160F RVW MEDS BY RX/DR IN RCRD: CPT | Performed by: NURSE PRACTITIONER

## 2024-02-13 PROCEDURE — 99214 OFFICE O/P EST MOD 30 MIN: CPT | Performed by: NURSE PRACTITIONER

## 2024-02-13 RX ORDER — OMEPRAZOLE 20 MG/1
20 CAPSULE, DELAYED RELEASE ORAL DAILY
Qty: 90 CAPSULE | Refills: 1 | Status: SHIPPED | OUTPATIENT
Start: 2024-02-13

## 2024-02-13 RX ORDER — VENLAFAXINE HYDROCHLORIDE 150 MG/1
150 CAPSULE, EXTENDED RELEASE ORAL DAILY
Qty: 90 CAPSULE | Refills: 1 | Status: SHIPPED | OUTPATIENT
Start: 2024-02-13

## 2024-03-19 ENCOUNTER — TELEPHONE (OUTPATIENT)
Dept: FAMILY MEDICINE CLINIC | Age: 24
End: 2024-03-19
Payer: COMMERCIAL

## 2024-03-19 NOTE — TELEPHONE ENCOUNTER
Pt was called due to the appt that was no showed on 03/18/2024. A voicemail was left for the pt to call the office if they needed to reschedule and to inform them of the no show policy. A letter will also be sent.

## 2024-03-20 ENCOUNTER — TELEPHONE (OUTPATIENT)
Dept: FAMILY MEDICINE CLINIC | Age: 24
End: 2024-03-20
Payer: COMMERCIAL

## 2024-09-30 DIAGNOSIS — K21.9 GASTROESOPHAGEAL REFLUX DISEASE, UNSPECIFIED WHETHER ESOPHAGITIS PRESENT: ICD-10-CM

## 2024-09-30 DIAGNOSIS — F41.1 GENERALIZED ANXIETY DISORDER: ICD-10-CM

## 2024-09-30 RX ORDER — VENLAFAXINE HYDROCHLORIDE 150 MG/1
150 CAPSULE, EXTENDED RELEASE ORAL DAILY
Qty: 30 CAPSULE | Refills: 0 | Status: SHIPPED | OUTPATIENT
Start: 2024-09-30

## 2024-09-30 NOTE — TELEPHONE ENCOUNTER
Caller: RAYA DIAZ    Relationship to patient: Emergency Contact    Best call back number: [phone]     Patient is needing: CALLER STATES PATIENT IS COMPLETELY OUT OF MEDICATION.    HUB SCHEDULED FIRST AVAILABLE APPOINTMENT THAT WORKED WITH PATIENT FOR 10.08.2024 AT 1700.    CAN PROVIDER PLEASE CALL IN ENOUGH MEDICATION TO GET PATIENT TO APPOINTMENT.    PLEASE CONTACT CALLER TO ADVISE.         Is it okay if the provider responds through MyChart: NO, CALL PREFERRED MAY LEAVE VOICEMAIL.

## 2024-11-05 ENCOUNTER — OFFICE VISIT (OUTPATIENT)
Dept: FAMILY MEDICINE CLINIC | Age: 24
End: 2024-11-05
Payer: COMMERCIAL

## 2024-11-05 VITALS
SYSTOLIC BLOOD PRESSURE: 118 MMHG | HEIGHT: 66 IN | OXYGEN SATURATION: 100 % | TEMPERATURE: 97.7 F | WEIGHT: 179 LBS | BODY MASS INDEX: 28.77 KG/M2 | HEART RATE: 71 BPM | DIASTOLIC BLOOD PRESSURE: 74 MMHG

## 2024-11-05 DIAGNOSIS — R93.2 ABNORMAL GALLBLADDER ULTRASOUND: ICD-10-CM

## 2024-11-05 DIAGNOSIS — F41.1 GENERALIZED ANXIETY DISORDER: ICD-10-CM

## 2024-11-05 DIAGNOSIS — Z00.00 ROUTINE GENERAL MEDICAL EXAMINATION AT A HEALTH CARE FACILITY: Primary | ICD-10-CM

## 2024-11-05 DIAGNOSIS — K21.9 GASTROESOPHAGEAL REFLUX DISEASE, UNSPECIFIED WHETHER ESOPHAGITIS PRESENT: ICD-10-CM

## 2024-11-05 PROCEDURE — 99395 PREV VISIT EST AGE 18-39: CPT | Performed by: NURSE PRACTITIONER

## 2024-11-05 PROCEDURE — 1160F RVW MEDS BY RX/DR IN RCRD: CPT | Performed by: NURSE PRACTITIONER

## 2024-11-05 PROCEDURE — 1159F MED LIST DOCD IN RCRD: CPT | Performed by: NURSE PRACTITIONER

## 2024-11-05 PROCEDURE — 2014F MENTAL STATUS ASSESS: CPT | Performed by: NURSE PRACTITIONER

## 2024-11-05 RX ORDER — VENLAFAXINE HYDROCHLORIDE 150 MG/1
150 CAPSULE, EXTENDED RELEASE ORAL DAILY
Qty: 90 CAPSULE | Refills: 1 | Status: SHIPPED | OUTPATIENT
Start: 2024-11-05

## 2024-11-05 RX ORDER — VENLAFAXINE HYDROCHLORIDE 75 MG/1
75 CAPSULE, EXTENDED RELEASE ORAL DAILY
Qty: 90 CAPSULE | Refills: 1 | Status: SHIPPED | OUTPATIENT
Start: 2024-11-05

## 2024-11-05 NOTE — PROGRESS NOTES
Chief Complaint  Malcolm Weeks presents to [facility] for Anxiety (Discuss medication / follow up ) and Heartburn      Subjective     History of Present Illness  Malcolm is here today for annual exam.   Last annual exam was  1 year(s)  Last eye exam: 1 year  PROVIDER:  [facility]   Last dental exam:   6 months    PROVIDER:  [facility]    Diet / exercise:   No special diet or specific exercise program         10 year cardiovascular risk assessment  The ASCVD Risk score (Jazzmine DK, et al., 2019) failed to calculate for the following reasons:    The 2019 ASCVD risk score is only valid for ages 40 to 79     Patient Care Team:  Natalya Obando APRN as PCP - General (Nurse Practitioner)     Malcolm Weeks DECLINES or DEFERS THE FOLLOWING HEALTH MAINTENANCE RECOMMENDATIONS DISCUSSED TODAY:  >Influenza vaccine, >Pneumococcal Vaccine, >Tdap, and >Covid 19 vaccination  Malcolm presents today for follow up on Anxiety   He reports that He Is doing well on current treatment of Effexor but does feel some of his anger creeping back in .  He has been on this dose for some time and may need to make some adjustments.    He denies current suicidal and homicidal ideation.    Current psychotherapy : No  Last visit Malcolm was advised to have an evaluation by general surgery to have his gallbladder removed.  He did have an ultrasound of his gallbladder done back in March and was scheduled for general surgery however we had a family emergency come up and was unable to get this done and at this time he has not rescheduled.  He continues to take omeprazole for heartburn.He does feel some irritation in his throat .  He denies any abdominal pain per say except in his epigastric area , that he attributes to the acid reflux        Assessment and Plan         - well balanced diet and exercise as tolerated  - annual wellness exams are recommended  - health care maintenance and gaps in  care and orders have been placed as necessary and as willing by the patient.     Diagnoses and all orders for this visit:    1. Routine general medical examination at a health care facility (Primary)    2. Gastroesophageal reflux disease, unspecified whether esophagitis present  Comments:  referral to general surgery for EGD / possible GB as cause  Orders:  -     Comprehensive metabolic panel; Future  -     omeprazole (priLOSEC) 20 MG capsule; Take 1 capsule by mouth Daily.  Dispense: 90 capsule; Refill: 1  -     Ambulatory Referral to General Surgery    3. Abnormal gallbladder ultrasound  -     Comprehensive metabolic panel; Future  -     Ambulatory Referral to General Surgery    4. Generalized anxiety disorder  Comments:  add 75 mg for a total of 225 mg daily   f/u if symptoms persist, otherwise, 6 month  Orders:  -     venlafaxine XR (EFFEXOR-XR) 150 MG 24 hr capsule; Take 1 capsule by mouth Daily.  Dispense: 90 capsule; Refill: 1  -     venlafaxine XR (Effexor XR) 75 MG 24 hr capsule; Take 1 capsule by mouth Daily. Take with the 150 mg for total 225 mg  Dispense: 90 capsule; Refill: 1         Follow Up     Return in about 6 months (around 5/5/2025) for Recheck.  Future Appointments   Date Time Provider Department Center   5/6/2025  9:00 AM Natalya Obando APRN [facility]       New Medications Ordered This Visit   Medications    venlafaxine XR (EFFEXOR-XR) 150 MG 24 hr capsule     Sig: Take 1 capsule by mouth Daily.     Dispense:  90 capsule     Refill:  1    omeprazole (priLOSEC) 20 MG capsule     Sig: Take 1 capsule by mouth Daily.     Dispense:  90 capsule     Refill:  1    venlafaxine XR (Effexor XR) 75 MG 24 hr capsule     Sig: Take 1 capsule by mouth Daily. Take with the 150 mg for total 225 mg     Dispense:  90 capsule     Refill:  1       Medications Discontinued During This Encounter   Medication Reason    omeprazole (priLOSEC) 20 MG capsule Reorder    venlafaxine XR (EFFEXOR-XR) 150 MG 24 hr  "capsule Reorder         Review of Systems    Objective     Vitals:    11/05/24 0804   BP: 118/74   BP Location: Left arm   Patient Position: Sitting   Cuff Size: Adult   Pulse: 71   Temp: 97.7 °F (36.5 °C)   TempSrc: Temporal   SpO2: 100%   Weight: 81.2 kg (179 lb)   Height: 167.6 cm (66\")       Physical Exam  Vitals reviewed.   Constitutional:       Appearance: Normal appearance.   HENT:      Head: Normocephalic.   Eyes:      Pupils: Pupils are equal, round, and reactive to light.   Cardiovascular:      Rate and Rhythm: Normal rate and regular rhythm.      Heart sounds: No murmur heard.  Pulmonary:      Effort: Pulmonary effort is normal.      Breath sounds: Normal breath sounds.   Abdominal:      Tenderness: There is abdominal tenderness in the epigastric area.   Musculoskeletal:         General: Normal range of motion.   Neurological:      Mental Status: He is alert.   Psychiatric:         Mood and Affect: Mood normal.         Behavior: Behavior normal.                 Result Review         Allergies   Allergen Reactions    Codeine Anaphylaxis      Past Medical History:   Diagnosis Date    Acid reflux     Hypertension      Current Outpatient Medications   Medication Sig Dispense Refill    omeprazole (priLOSEC) 20 MG capsule Take 1 capsule by mouth Daily. 90 capsule 1    venlafaxine XR (EFFEXOR-XR) 150 MG 24 hr capsule Take 1 capsule by mouth Daily. 90 capsule 1    venlafaxine XR (Effexor XR) 75 MG 24 hr capsule Take 1 capsule by mouth Daily. Take with the 150 mg for total 225 mg 90 capsule 1     No current facility-administered medications for this visit.     Past Surgical History:   Procedure Laterality Date    ADENOIDECTOMY      APPENDECTOMY      EYE SURGERY      TONSILLECTOMY        Health Maintenance Due   Topic Date Due    TDAP/TD VACCINES (2 - Td or Tdap) 10/10/2018      Immunization History   Administered Date(s) Administered    COVID-19 (PFIZER) Purple Cap Monovalent 11/18/2021    FluMist 2-49yrs " 09/23/2014    FluMist 2-49yrs (Nasal) 10/07/2009    Fluzone  >6mos 10/24/2011, 08/19/2013    Hepatitis A 08/21/2018    Influenza, Unspecified 11/18/2010, 02/27/2013    Meningococcal Conjugate 10/10/2008, 09/04/2014    Tdap 10/10/2008    Varicella 10/24/2011         Part of this note may be an electronic transcription/translation of spoken language to printed   text using the Dragon Dictation System.      Natalya Obando, APRN

## 2024-12-04 ENCOUNTER — TELEPHONE (OUTPATIENT)
Dept: FAMILY MEDICINE CLINIC | Age: 24
End: 2024-12-04
Payer: COMMERCIAL

## 2025-05-05 DIAGNOSIS — K21.9 GASTROESOPHAGEAL REFLUX DISEASE, UNSPECIFIED WHETHER ESOPHAGITIS PRESENT: ICD-10-CM

## 2025-05-05 DIAGNOSIS — F41.1 GENERALIZED ANXIETY DISORDER: ICD-10-CM

## 2025-05-06 NOTE — TELEPHONE ENCOUNTER
Caller: RAYA DIAZ    Relationship: Emergency Contact    Best call back number: [phone]     Requested Prescriptions:   Requested Prescriptions     Pending Prescriptions Disp Refills    venlafaxine XR (EFFEXOR-XR) 75 MG 24 hr capsule [Pharmacy Med Name: Venlafaxine HCl ER 75 MG Oral Capsule Extended Release 24 Hour (Effexor XR)] 30 capsule      Sig: TAKE 1 CAPSULE BY MOUTH DAILY. TAKE WITH  MG FOR TOTAL 225 MG    venlafaxine XR (EFFEXOR-XR) 150 MG 24 hr capsule [Pharmacy Med Name: Venlafaxine HCl  MG Oral Capsule Extended Release 24 Hour (Effexor XR)] 30 capsule      Sig: TAKE 1 CAPSULE BY MOUTH DAILY.    omeprazole (priLOSEC) 20 MG capsule 90 capsule 1     Sig: Take 1 capsule by mouth Daily.        [place] [facility] [place] [facility] [place] [place] [facility] [address] [facility] [phone] [facility] [phone] FX     Last office visit with prescribing clinician: 11/5/2024   Last telemedicine visit with prescribing clinician: Visit date not found   Next office visit with prescribing clinician: Visit date not found     Additional details provided by patient: LESS THAN THREE DAY SUPPLY     Does the patient have less than a 3 day supply:  [x] Yes  [] No      Carmencita Galeana Rep   05/06/25 10:53 EDT

## 2025-05-07 RX ORDER — VENLAFAXINE HYDROCHLORIDE 150 MG/1
150 CAPSULE, EXTENDED RELEASE ORAL DAILY
Qty: 30 CAPSULE | Refills: 0 | Status: SHIPPED | OUTPATIENT
Start: 2025-05-07

## 2025-05-07 RX ORDER — OMEPRAZOLE 20 MG/1
20 CAPSULE, DELAYED RELEASE ORAL DAILY
Qty: 90 CAPSULE | Refills: 1 | Status: SHIPPED | OUTPATIENT
Start: 2025-05-07

## 2025-05-07 RX ORDER — VENLAFAXINE HYDROCHLORIDE 75 MG/1
75 CAPSULE, EXTENDED RELEASE ORAL DAILY
Qty: 30 CAPSULE | Refills: 0 | Status: SHIPPED | OUTPATIENT
Start: 2025-05-07

## 2025-05-15 ENCOUNTER — TELEPHONE (OUTPATIENT)
Dept: FAMILY MEDICINE CLINIC | Age: 25
End: 2025-05-15
Payer: COMMERCIAL

## 2025-05-15 NOTE — TELEPHONE ENCOUNTER
HUB TO READ, Called and spoke with pt regarding no show, informed them of no show policy, they did reschedule appt- 5/15/25 AB

## 2025-06-10 ENCOUNTER — RESULTS FOLLOW-UP (OUTPATIENT)
Dept: FAMILY MEDICINE CLINIC | Age: 25
End: 2025-06-10

## 2025-06-10 ENCOUNTER — OFFICE VISIT (OUTPATIENT)
Dept: FAMILY MEDICINE CLINIC | Age: 25
End: 2025-06-10
Payer: COMMERCIAL

## 2025-06-10 VITALS
HEIGHT: 66 IN | OXYGEN SATURATION: 96 % | TEMPERATURE: 98.3 F | HEART RATE: 93 BPM | DIASTOLIC BLOOD PRESSURE: 72 MMHG | BODY MASS INDEX: 33.46 KG/M2 | SYSTOLIC BLOOD PRESSURE: 123 MMHG | WEIGHT: 208.2 LBS

## 2025-06-10 DIAGNOSIS — J06.9 ACUTE URI: Primary | ICD-10-CM

## 2025-06-10 DIAGNOSIS — F41.1 GENERALIZED ANXIETY DISORDER: ICD-10-CM

## 2025-06-10 DIAGNOSIS — R09.81 NASAL CONGESTION: ICD-10-CM

## 2025-06-10 LAB
EXPIRATION DATE: NORMAL
FLUAV AG UPPER RESP QL IA.RAPID: NOT DETECTED
FLUBV AG UPPER RESP QL IA.RAPID: NOT DETECTED
INTERNAL CONTROL: NORMAL
Lab: NORMAL
SARS-COV-2 AG UPPER RESP QL IA.RAPID: NOT DETECTED

## 2025-06-10 PROCEDURE — 1160F RVW MEDS BY RX/DR IN RCRD: CPT

## 2025-06-10 PROCEDURE — 99213 OFFICE O/P EST LOW 20 MIN: CPT

## 2025-06-10 PROCEDURE — 1159F MED LIST DOCD IN RCRD: CPT

## 2025-06-10 PROCEDURE — 87428 SARSCOV & INF VIR A&B AG IA: CPT

## 2025-06-10 RX ORDER — FLUTICASONE PROPIONATE 50 MCG
2 SPRAY, SUSPENSION (ML) NASAL DAILY
Qty: 16 G | Refills: 0 | Status: SHIPPED | OUTPATIENT
Start: 2025-06-10

## 2025-06-10 NOTE — PROGRESS NOTES
Subjective     CHIEF COMPLAINT    Chief Complaint   Patient presents with    Nasal Congestion     X 3 days.     Headache    Fatigue       History of Present Illness:  Malcolm Weeks is a 25 y.o. male who presents to [facility] with acute complaint of congestion, headache, fatigue. He notes a cough in the AM. He is a smoker, cough is not different than normal. He notes clearing his throat a lot. He has not taken any medications at home for symptoms. Symptoms present for 3 days. No known exposures to any illnesses. Denies fever, chills, wheezing, SOB, chest pain.         Review of Systems   Constitutional:  Positive for fatigue. Negative for chills and fever.   HENT:  Positive for congestion. Negative for rhinorrhea and sore throat.    Respiratory:  Negative for cough, shortness of breath and wheezing.    Cardiovascular:  Negative for chest pain.   Gastrointestinal:  Negative for nausea and vomiting.   Musculoskeletal:  Negative for myalgias.   Neurological:  Positive for headaches.         Past Medical History:   Diagnosis Date    Acid reflux     Hypertension          Past Surgical History:   Procedure Laterality Date    ADENOIDECTOMY      APPENDECTOMY      EYE SURGERY      TONSILLECTOMY           Family History   Problem Relation Age of Onset    No Known Problems Mother     Heart disease Father     Cancer Father         blood cancer         Social History     Socioeconomic History    Marital status: Single    Number of children: 1   Tobacco Use    Smoking status: Every Day     Current packs/day: 1.00     Average packs/day: 1 pack/day for 10.0 years (10.0 ttl pk-yrs)     Types: Cigarettes    Smokeless tobacco: Never   Vaping Use    Vaping status: Never Used   Substance and Sexual Activity    Alcohol use: Not Currently    Drug use: Defer    Sexual activity: Defer         Allergies   Allergen Reactions    Codeine Anaphylaxis          Current Outpatient Medications on File Prior to  "Visit   Medication Sig Dispense Refill    omeprazole (priLOSEC) 20 MG capsule Take 1 capsule by mouth Daily. 90 capsule 1    venlafaxine XR (EFFEXOR-XR) 150 MG 24 hr capsule TAKE 1 CAPSULE BY MOUTH DAILY. 30 capsule 0    venlafaxine XR (EFFEXOR-XR) 75 MG 24 hr capsule TAKE 1 CAPSULE BY MOUTH DAILY. TAKE WITH  MG FOR TOTAL 225 MG 30 capsule 0     No current facility-administered medications on file prior to visit.     /72   Pulse 93   Temp 98.3 °F (36.8 °C) (Oral)   Ht 167.6 cm (65.98\")   Wt 94.4 kg (208 lb 3.2 oz)   SpO2 96% Comment: room air  BMI 33.62 kg/m²       Objective     Physical Exam  Vitals and nursing note reviewed.   Constitutional:       General: He is not in acute distress.     Appearance: Normal appearance. He is not ill-appearing.   HENT:      Head: Normocephalic.      Right Ear: Tympanic membrane, ear canal and external ear normal.      Left Ear: Tympanic membrane, ear canal and external ear normal.      Nose: Nose normal.      Right Sinus: No maxillary sinus tenderness or frontal sinus tenderness.      Left Sinus: No maxillary sinus tenderness or frontal sinus tenderness.      Mouth/Throat:      Lips: Pink.      Mouth: Mucous membranes are moist.      Pharynx: Oropharynx is clear. Uvula midline. No pharyngeal swelling, oropharyngeal exudate, posterior oropharyngeal erythema or uvula swelling.   Eyes:      Pupils: Pupils are equal, round, and reactive to light.   Cardiovascular:      Rate and Rhythm: Normal rate and regular rhythm.      Heart sounds: Normal heart sounds. No murmur heard.  Pulmonary:      Effort: Pulmonary effort is normal. No accessory muscle usage or respiratory distress.      Breath sounds: Normal breath sounds. No wheezing or rhonchi.   Musculoskeletal:      Cervical back: Normal range of motion.   Lymphadenopathy:      Cervical: No cervical adenopathy.   Skin:     General: Skin is warm and dry.   Neurological:      General: No focal deficit present.      " Mental Status: He is alert and oriented to person, place, and time.   Psychiatric:         Mood and Affect: Mood and affect normal.         Behavior: Behavior normal.           Lab Results (last 24 hours)       Procedure Component Value Units Date/Time    POCT SARS-CoV-2 Antigen HOLLAND + Flu [[id number]] Collected: 06/10/25 1610    Specimen: Swab Updated: 06/10/25 1610     SARS Antigen Not Detected     Influenza A Antigen HOLLAND Not Detected     Influenza B Antigen HOLLAND Not Detected     Internal Control Passed     Lot Number [id number]     Expiration Date 1-17-26            Assessment & Plan  Acute URI  COVID and flu testing negative. No evidence of bacterial infection on exam today, suspect viral illness with allergies possible contributing. Discussed symptomatic treatment. Flonase sent to pharmacy for patient. May also consider use of humidification in room, OTC antihistamine. Cough is not too bothersome to him so declines cough medication, can use Mucinex OTC if needed. Advised patient to notify me if symptoms have been present for 7-10 days without improvement and can consider use of antibiotics for sinusitis.     Orders:    fluticasone (FLONASE) 50 MCG/ACT nasal spray; Administer 2 sprays into the nostril(s) as directed by provider Daily.    Nasal congestion    Orders:    POCT SARS-CoV-2 Antigen HOLLAND + Flu             Follow up:  Return if symptoms worsen or fail to improve.  Patient was given instructions and counseling regarding his condition or for health maintenance advice. Please see specific information pulled into the AVS if appropriate.

## 2025-06-11 RX ORDER — VENLAFAXINE HYDROCHLORIDE 150 MG/1
150 CAPSULE, EXTENDED RELEASE ORAL DAILY
Qty: 30 CAPSULE | Refills: 3 | Status: SHIPPED | OUTPATIENT
Start: 2025-06-11

## 2025-06-11 RX ORDER — VENLAFAXINE HYDROCHLORIDE 75 MG/1
75 CAPSULE, EXTENDED RELEASE ORAL DAILY
Qty: 30 CAPSULE | Refills: 3 | Status: SHIPPED | OUTPATIENT
Start: 2025-06-11

## 2025-06-11 RX ORDER — VENLAFAXINE HYDROCHLORIDE 150 MG/1
150 CAPSULE, EXTENDED RELEASE ORAL DAILY
Qty: 30 CAPSULE | Refills: 0 | Status: SHIPPED | OUTPATIENT
Start: 2025-06-11

## 2025-06-11 RX ORDER — VENLAFAXINE HYDROCHLORIDE 75 MG/1
75 CAPSULE, EXTENDED RELEASE ORAL DAILY
Qty: 30 CAPSULE | Refills: 0 | Status: SHIPPED | OUTPATIENT
Start: 2025-06-11

## 2025-06-24 ENCOUNTER — OFFICE VISIT (OUTPATIENT)
Dept: FAMILY MEDICINE CLINIC | Age: 25
End: 2025-06-24
Payer: COMMERCIAL

## 2025-06-24 VITALS
DIASTOLIC BLOOD PRESSURE: 80 MMHG | BODY MASS INDEX: 34.52 KG/M2 | OXYGEN SATURATION: 94 % | SYSTOLIC BLOOD PRESSURE: 118 MMHG | WEIGHT: 214.8 LBS | HEART RATE: 119 BPM | HEIGHT: 66 IN | TEMPERATURE: 98 F

## 2025-06-24 DIAGNOSIS — R05.1 ACUTE COUGH: ICD-10-CM

## 2025-06-24 DIAGNOSIS — J40 BRONCHITIS: Primary | ICD-10-CM

## 2025-06-24 PROCEDURE — 87428 SARSCOV & INF VIR A&B AG IA: CPT | Performed by: NURSE PRACTITIONER

## 2025-06-24 PROCEDURE — 99213 OFFICE O/P EST LOW 20 MIN: CPT | Performed by: NURSE PRACTITIONER

## 2025-06-24 RX ORDER — METHYLPREDNISOLONE 4 MG/1
TABLET ORAL
Qty: 21 TABLET | Refills: 0 | Status: SHIPPED | OUTPATIENT
Start: 2025-06-24 | End: 2025-06-30

## 2025-06-24 RX ORDER — ALBUTEROL SULFATE 90 UG/1
2 INHALANT RESPIRATORY (INHALATION) EVERY 4 HOURS PRN
Qty: 18 G | Refills: 0 | Status: SHIPPED | OUTPATIENT
Start: 2025-06-24

## 2025-06-24 RX ORDER — GUAIFENESIN 600 MG/1
1200 TABLET, EXTENDED RELEASE ORAL 2 TIMES DAILY
Qty: 20 TABLET | Refills: 0 | Status: SHIPPED | OUTPATIENT
Start: 2025-06-24 | End: 2025-06-29

## 2025-06-24 NOTE — PROGRESS NOTES
Chief Complaint  Malcolm Weeks presents to [facility] for Cough (Fever x 1 day )    Subjective     History of Present Illness    Malcolm  here today for concerns of URI symptoms      Known Exposure to positive case?  No  Date of exposure?  unknown date  Date of symptoms start?   3 days   (Symptoms may appear 2-14 days after exposure )    Fever or chills?  Yes, describe: 101  Cough?   yes  Shortness of breath or difficulty breathing?  no  Fatigue?   yes  Muscle or body aches?  yes   Headache?   yes  New loss of taste or smell? no  Sore throat?  yes  Congestion or runny nose? yes  Nausea or vomiting?   yes  Diarrhea?   no    Taking any medications at home to help with symptoms?Yes, describe: ibuprofen  Any prior vaccine to covid?   no  Any prior vaccine to flu this season? no  Any significant health problems / existing lung / heart problems?  No              Assessment and Plan     Diagnoses and all orders for this visit:    1. Bronchitis (Primary)  Comments:  follwo up if new or worsening sympotms  Orders:  -     guaiFENesin (Mucinex) 600 MG 12 hr tablet; Take 2 tablets by mouth 2 (Two) Times a Day for 5 days.  Dispense: 20 tablet; Refill: 0  -     albuterol sulfate  (90 Base) MCG/ACT inhaler; Inhale 2 puffs Every 4 (Four) Hours As Needed for Wheezing.  Dispense: 18 g; Refill: 0  -     methylPREDNISolone (Medrol) 4 MG dose pack; Take as directed on package instructions.  Dispense: 21 tablet; Refill: 0    2. Acute cough  -     POCT SARS-CoV-2 Antigen [place] + Flu            Follow Up   Return if symptoms worsen or fail to improve.  Future Appointments   Date Time Provider Department Center   6/30/2025  5:00 PM Natalya Obando APRN [facility]       New Medications Ordered This Visit   Medications    guaiFENesin (Mucinex) 600 MG 12 hr tablet     Sig: Take 2 tablets by mouth 2 (Two) Times a Day for 5 days.     Dispense:  20 tablet     Refill:  0    albuterol sulfate   "(90 Base) MCG/ACT inhaler     Sig: Inhale 2 puffs Every 4 (Four) Hours As Needed for Wheezing.     Dispense:  18 g     Refill:  0    methylPREDNISolone (Medrol) 4 MG dose pack     Sig: Take as directed on package instructions.     Dispense:  21 tablet     Refill:  0       Medications Discontinued During This Encounter   Medication Reason    venlafaxine XR (EFFEXOR-XR) 150 MG 24 hr capsule Duplicate order    venlafaxine XR (EFFEXOR-XR) 75 MG 24 hr capsule Duplicate order          Review of Systems    Objective     Vitals:    06/24/25 1537   BP: 118/80   BP Location: Right arm   Patient Position: Sitting   Cuff Size: Adult   Pulse: 119   Temp: 98 °F (36.7 °C)   TempSrc: Oral   SpO2: 94%   Weight: 97.4 kg (214 lb 12.8 oz)   Height: 167.6 cm (65.98\")         Physical Exam  Vitals reviewed.   Constitutional:       Appearance: Normal appearance. He is ill-appearing.   HENT:      Head: Normocephalic.   Eyes:      Pupils: Pupils are equal, round, and reactive to light.   Cardiovascular:      Rate and Rhythm: Normal rate and regular rhythm.      Heart sounds: No murmur heard.  Pulmonary:      Effort: Pulmonary effort is normal.      Breath sounds: Normal breath sounds. No decreased breath sounds, wheezing or rhonchi.   Musculoskeletal:         General: Normal range of motion.   Neurological:      Mental Status: He is alert.   Psychiatric:         Mood and Affect: Mood normal.         Behavior: Behavior normal.               Result Review          Allergies   Allergen Reactions    Codeine Anaphylaxis      Past Medical History:   Diagnosis Date    Acid reflux     Hypertension      Current Outpatient Medications   Medication Sig Dispense Refill    fluticasone (FLONASE) 50 MCG/ACT nasal spray Administer 2 sprays into the nostril(s) as directed by provider Daily. 16 g 0    omeprazole (priLOSEC) 20 MG capsule Take 1 capsule by mouth Daily. 90 capsule 1    venlafaxine XR (EFFEXOR-XR) 150 MG 24 hr capsule Take 1 capsule by mouth " Daily. 30 capsule 3    venlafaxine XR (EFFEXOR-XR) 75 MG 24 hr capsule Take 1 capsule by mouth Daily. Take with the 150 mg for total 225 mg 30 capsule 3    albuterol sulfate  (90 Base) MCG/ACT inhaler Inhale 2 puffs Every 4 (Four) Hours As Needed for Wheezing. 18 g 0    guaiFENesin (Mucinex) 600 MG 12 hr tablet Take 2 tablets by mouth 2 (Two) Times a Day for 5 days. 20 tablet 0    methylPREDNISolone (Medrol) 4 MG dose pack Take as directed on package instructions. 21 tablet 0     No current facility-administered medications for this visit.     Past Surgical History:   Procedure Laterality Date    ADENOIDECTOMY      APPENDECTOMY      EYE SURGERY      TONSILLECTOMY        There are no preventive care reminders to display for this patient.   Immunization History   Administered Date(s) Administered    COVID-19 (PFIZER) Purple Cap Monovalent 11/18/2021    FluMist 2-49yrs 09/23/2014    FluMist 2-49yrs (Nasal) 10/07/2009    Fluzone  >6mos 10/24/2011, 08/19/2013    Hepatitis A 08/21/2018    Influenza, Unspecified 11/18/2010, 02/27/2013    Meningococcal Conjugate 10/10/2008, 09/04/2014    Tdap 10/10/2008    Varicella 10/24/2011         Part of this note may be an electronic transcription/translation of spoken language to printed   text using the Dragon Dictation System.      WEI Siddiqi

## 2025-06-30 ENCOUNTER — OFFICE VISIT (OUTPATIENT)
Dept: FAMILY MEDICINE CLINIC | Age: 25
End: 2025-06-30
Payer: COMMERCIAL

## 2025-06-30 VITALS
OXYGEN SATURATION: 96 % | HEIGHT: 66 IN | BODY MASS INDEX: 34.23 KG/M2 | WEIGHT: 213 LBS | HEART RATE: 92 BPM | SYSTOLIC BLOOD PRESSURE: 114 MMHG | DIASTOLIC BLOOD PRESSURE: 76 MMHG | TEMPERATURE: 98 F

## 2025-06-30 DIAGNOSIS — Z13.6 SCREENING FOR CARDIOVASCULAR CONDITION: Primary | ICD-10-CM

## 2025-06-30 DIAGNOSIS — F41.1 GENERALIZED ANXIETY DISORDER: ICD-10-CM

## 2025-06-30 PROCEDURE — 99213 OFFICE O/P EST LOW 20 MIN: CPT | Performed by: NURSE PRACTITIONER

## 2025-06-30 PROCEDURE — 1159F MED LIST DOCD IN RCRD: CPT | Performed by: NURSE PRACTITIONER

## 2025-06-30 PROCEDURE — 1160F RVW MEDS BY RX/DR IN RCRD: CPT | Performed by: NURSE PRACTITIONER

## 2025-06-30 RX ORDER — VENLAFAXINE HYDROCHLORIDE 150 MG/1
150 CAPSULE, EXTENDED RELEASE ORAL DAILY
Qty: 30 CAPSULE | Refills: 5 | Status: SHIPPED | OUTPATIENT
Start: 2025-06-30

## 2025-06-30 RX ORDER — VENLAFAXINE HYDROCHLORIDE 75 MG/1
75 CAPSULE, EXTENDED RELEASE ORAL DAILY
Qty: 30 CAPSULE | Refills: 5 | Status: SHIPPED | OUTPATIENT
Start: 2025-06-30

## 2025-06-30 NOTE — PROGRESS NOTES
Chief Complaint  Malcolm Weeks presents to [facility] for Anxiety (6 month )    Subjective     History of Present Illness  Malcolm is in today to follow-up on his anxiety.  It is of note that he does feel better after his recent visit here from his upper respiratory infection.  Regarding his anxiety, Malcolm is taking venlafaxine 225 mg daily and feels that his symptoms are very well-controlled    Malcolm is due for routine labs/cardiovascular screening  Assessment and Plan     Diagnoses and all orders for this visit:    1. Screening for cardiovascular condition (Primary)  -     Comprehensive metabolic panel; Future  -     Lipid panel; Future    2. Generalized anxiety disorder  Comments:  Continue current treatment follow-up in 6 months-total of 225 mg daily  Orders:  -     venlafaxine XR (EFFEXOR-XR) 150 MG 24 hr capsule; Take 1 capsule by mouth Daily.  Dispense: 30 capsule; Refill: 5  -     venlafaxine XR (EFFEXOR-XR) 75 MG 24 hr capsule; Take 1 capsule by mouth Daily. Take with the 150 mg for total 225 mg  Dispense: 30 capsule; Refill: 5            Follow Up   Return in about 6 months (around 12/30/2025) for Recheck, Annual physical.  Future Appointments   Date Time Provider Department Center   1/2/2026  4:00 PM Gillian Hardy APRN [facility]       New Medications Ordered This Visit   Medications    venlafaxine XR (EFFEXOR-XR) 150 MG 24 hr capsule     Sig: Take 1 capsule by mouth Daily.     Dispense:  30 capsule     Refill:  5    venlafaxine XR (EFFEXOR-XR) 75 MG 24 hr capsule     Sig: Take 1 capsule by mouth Daily. Take with the 150 mg for total 225 mg     Dispense:  30 capsule     Refill:  5       Medications Discontinued During This Encounter   Medication Reason    methylPREDNISolone (Medrol) 4 MG dose pack *Therapy completed    venlafaxine XR (EFFEXOR-XR) 150 MG 24 hr capsule Reorder    venlafaxine XR (EFFEXOR-XR) 75 MG 24 hr capsule Reorder          Review of  "Systems    Objective     Vitals:    06/30/25 1651   BP: 114/76   BP Location: Left arm   Patient Position: Sitting   Cuff Size: Large Adult   Pulse: 92   Temp: 98 °F (36.7 °C)   TempSrc: Oral   SpO2: 96%   Weight: 96.6 kg (213 lb)   Height: 167.6 cm (65.98\")         Physical Exam  Vitals reviewed.   Constitutional:       Appearance: Normal appearance.   HENT:      Head: Normocephalic.   Eyes:      Pupils: Pupils are equal, round, and reactive to light.   Cardiovascular:      Rate and Rhythm: Normal rate and regular rhythm.      Heart sounds: No murmur heard.  Pulmonary:      Effort: Pulmonary effort is normal.      Breath sounds: Normal breath sounds.   Musculoskeletal:         General: Normal range of motion.   Neurological:      Mental Status: He is alert.   Psychiatric:         Mood and Affect: Mood normal.         Behavior: Behavior normal.               Result Review          Allergies   Allergen Reactions    Codeine Anaphylaxis      Past Medical History:   Diagnosis Date    Acid reflux     Hypertension      Current Outpatient Medications   Medication Sig Dispense Refill    albuterol sulfate  (90 Base) MCG/ACT inhaler Inhale 2 puffs Every 4 (Four) Hours As Needed for Wheezing. 18 g 0    omeprazole (priLOSEC) 20 MG capsule Take 1 capsule by mouth Daily. 90 capsule 1    venlafaxine XR (EFFEXOR-XR) 150 MG 24 hr capsule Take 1 capsule by mouth Daily. 30 capsule 5    venlafaxine XR (EFFEXOR-XR) 75 MG 24 hr capsule Take 1 capsule by mouth Daily. Take with the 150 mg for total 225 mg 30 capsule 5    fluticasone (FLONASE) 50 MCG/ACT nasal spray Administer 2 sprays into the nostril(s) as directed by provider Daily. (Patient not taking: Reported on 6/30/2025) 16 g 0     No current facility-administered medications for this visit.     Past Surgical History:   Procedure Laterality Date    ADENOIDECTOMY      APPENDECTOMY      EYE SURGERY      TONSILLECTOMY        There are no preventive care reminders to display for " this patient.   Immunization History   Administered Date(s) Administered    COVID-19 (PFIZER) Purple Cap Monovalent 11/18/2021    FluMist 2-49yrs 09/23/2014    FluMist 2-49yrs (Nasal) 10/07/2009    Fluzone  >6mos 10/24/2011, 08/19/2013    Hepatitis A 08/21/2018    Influenza, Unspecified 11/18/2010, 02/27/2013    Meningococcal Conjugate 10/10/2008, 09/04/2014    Tdap 10/10/2008    Varicella 10/24/2011         Part of this note may be an electronic transcription/translation of spoken language to printed   text using the Dragon Dictation System.      Natalya Obando, APRN

## 2025-07-21 ENCOUNTER — TELEPHONE (OUTPATIENT)
Dept: FAMILY MEDICINE CLINIC | Age: 25
End: 2025-07-21
Payer: COMMERCIAL

## 2025-07-28 NOTE — TELEPHONE ENCOUNTER
2nd attempt - pt grandmother informed of overdue lab ordered by pcp on 6/30/25/letter mailed. Pt grandmother states he is scared to have labs drawn with needle with him having a history of substance abuse and has been clean for 6 years.